# Patient Record
Sex: FEMALE | Race: WHITE
[De-identification: names, ages, dates, MRNs, and addresses within clinical notes are randomized per-mention and may not be internally consistent; named-entity substitution may affect disease eponyms.]

---

## 2017-01-26 ENCOUNTER — HOSPITAL ENCOUNTER (OUTPATIENT)
Dept: HOSPITAL 75 - RAD | Age: 47
End: 2017-01-26
Attending: INTERNAL MEDICINE
Payer: COMMERCIAL

## 2017-01-26 DIAGNOSIS — E04.1: Primary | ICD-10-CM

## 2017-01-26 PROCEDURE — 76536 US EXAM OF HEAD AND NECK: CPT

## 2017-01-26 NOTE — XMS REPORT
Continuity of Care Document

 Created on: 2017



ValentineMichelle sylvester

External Reference #: [id number]

: 1970

Sex: Female



Demographics







 Address  [address] [place] [address]  [address]

 

 Home Phone  [phone]

 

 Preferred Language  English

 

 Marital Status  

 

 Latter day Affiliation  UNK

 

 Race  Unknown

 

 Ethnic Group  Not  or 





Author







 Author  [facility]

 

 Organization  [facility]

 

 Address  Unknown

 

 Phone  Unavailable







Support







 Name  Relationship  Address  Phone

 

 , GORGE SIMMONS  ECON  Unknown  [phone]







Care Team Providers







 Care Team Member Name  Role  Phone

 

 Debby Terrell  PCP  [phone]







Source Comments

Some departments are not documenting in the electronic medical record.  If you 
do not see the information that you expected, contact Release of Information in 
the Health Information Management department at [phone] for further 
assistance in locating additional records.[facility]



Active Allergies and Adverse Reactions







    



  Allergen   Noted Date   Severity   Reactions   Comments

 

    



  Codeine   2013    UNKNOWN 

 

    



  Sulfur   2013    RASH 







Current Medications







      



  Prescription   Sig.   Disp.   Refills   Start   End Date   Status



      Date  

 

      



  ALPRAZolam (XANAX) 0.5 mg   Take 0.25 mg by mouth       Active



  tablet   every 8-12 hours as     



   needed.     

 

      



  methimazole (TAPAZOLE) 10   Take 30 mg by mouth three       Active



  mg tablet   times daily.     

 

      



  HYDROcodone-acetaminophen   Take 1 Tab by mouth every       Active



  (+) (LORTAB) 7.5-500 mg   4 hours as needed.     



  tablet      

 

      



  diclofenac(+) (VOLTAREN)   Apply to the upper   1 Tube   3   20    
Active



  1 % Gel topical gel   extremity - (2g) to the     13  



   affected area up to 4     



   times a day.  Do not     



   apply more than 8 g daily     



   to any one affected joint     



   of the upper extremity.     

 

      



  gluc-condr-om3-dha-epa-fi   Take  by mouth.       Active



  sh-st (GLUCOSAMINE      



  CHONDROITIN PLUS)      



  543-305-09-54 mg cap      

 

      



  MULTIVITAMIN PO   Take  by mouth.       Active







Active Problems







 



  Problem   Noted Date

 

 



  Subdeltoid bursitis   2013

 

 



  Carpal tunnel syndrome, bilateral   2013

 

 



  Shoulder pain, right   2013

 

 



  Carpal tunnel syndrome on both sides   2013

 

 



  Recurrent miscarriages   2013

 

 



  Grave's disease   2013

 

 



  Polyarthralgia   2013

 

 



  Fatigue   2013







Social History







    



  Tobacco Use   Types   Packs/Day   Years Used   Date

 

    



  Former Smoker      Quit: 2012







Last Filed Vital Signs







  



  Vital Sign   Reading   Time Taken

 

  



  Blood Pressure   119/70   2013 11:08 AM CDT

 

  



  Pulse   98   2013 11:08 AM CDT

 

  



  Temperature   36.8   C (98.2   F)   2013 11:08 AM CDT

 

  



  Respiratory Rate   17   2013 11:08 AM CDT

 

  



  Height   1.674 m (5' 5.91")   2013 11:08 AM CDT

 

  



  Weight   78.109 kg (172 lb 3.2 oz)   2013 11:08 AM CDT

 

  



  Body Mass Index   27.87   2013 11:08 AM CDT

 

  



  Oxygen Saturation   -   -







Plan of Care







   



  Health Maintenance   Due Date   Last Done   Comments

 

   



  Physical (Comprehensive)   1979  



  Exam   

 

   



  Pertussis Vaccine   1983  

 

   



  Tetanus Vaccine   1989  

 

   



  Cervical Cancer Screening   1993  

 

   



  Influenza Vaccine   2016  







Results from Last 3 Months

Not on file

## 2017-01-26 NOTE — DIAGNOSTIC IMAGING REPORT
PROCEDURE: US Thyroid.



TECHNIQUE: Multiple real-time grayscale images were obtained of

the thyroid in various projections.



INDICATION: Follow-up exam.



FINDINGS: The previous thyroid ultrasound exam of 09/19/2016

noted a small 0.8 x 0.7 x 0.5 cm complex solid cystic mass in

the inferior pole of the right lobe of the thyroid. This did

seem slightly greater than noted on the prior exam of

12/30/2013. On this exam that finding is again evident and

unchanged in size or appearance. The overall appearance of the

thyroid gland itself is stable. No new abnormality has

developed.



IMPRESSION:

1. The small complex solid cystic mass in the inferior pole of

the right lobe of the thyroid seen previously is stable. Most

likely, this is a benign process. If further evaluation is

desired, then a follow-up ultrasound exam in six months should

be obtained.

2. The thyroid gland is otherwise stable.



Dictated by:



Dictated on workstation # [id number]

## 2017-01-27 ENCOUNTER — HOSPITAL ENCOUNTER (OUTPATIENT)
Dept: HOSPITAL 75 - CARD | Age: 47
End: 2017-01-27
Attending: PAIN MEDICINE
Payer: COMMERCIAL

## 2017-01-27 VITALS — SYSTOLIC BLOOD PRESSURE: 114 MMHG | DIASTOLIC BLOOD PRESSURE: 78 MMHG

## 2017-01-27 VITALS — BODY MASS INDEX: 25.07 KG/M2 | WEIGHT: 156 LBS | HEIGHT: 66 IN

## 2017-01-27 VITALS — DIASTOLIC BLOOD PRESSURE: 82 MMHG | SYSTOLIC BLOOD PRESSURE: 129 MMHG

## 2017-01-27 DIAGNOSIS — M53.3: ICD-10-CM

## 2017-01-27 DIAGNOSIS — M51.16: Primary | ICD-10-CM

## 2017-01-27 DIAGNOSIS — Z79.899: ICD-10-CM

## 2017-01-27 PROCEDURE — 27096 INJECT SACROILIAC JOINT: CPT

## 2017-01-27 PROCEDURE — 62323 NJX INTERLAMINAR LMBR/SAC: CPT

## 2017-01-27 NOTE — XMS REPORT
Continuity of Care Document

 Created on: 2017



ValentineMichelle sylvester

External Reference #: [id number]

: 1970

Sex: Female



Demographics







 Address  [address] [place] [address]  [address]

 

 Home Phone  [phone]

 

 Preferred Language  English

 

 Marital Status  

 

 Mu-ism Affiliation  UNK

 

 Race  Unknown

 

 Ethnic Group  Not  or 





Author







 Author  [facility]

 

 Organization  [facility]

 

 Address  Unknown

 

 Phone  Unavailable







Support







 Name  Relationship  Address  Phone

 

 , GORGE SIMMONS  ECON  Unknown  [phone]







Care Team Providers







 Care Team Member Name  Role  Phone

 

 Debby Terrell  PCP  [phone]







Source Comments

Some departments are not documenting in the electronic medical record.  If you 
do not see the information that you expected, contact Release of Information in 
the Health Information Management department at [phone] for further 
assistance in locating additional records.[facility]



Active Allergies and Adverse Reactions







    



  Allergen   Noted Date   Severity   Reactions   Comments

 

    



  Codeine   2013    UNKNOWN 

 

    



  Sulfur   2013    RASH 







Current Medications







      



  Prescription   Sig.   Disp.   Refills   Start   End Date   Status



      Date  

 

      



  ALPRAZolam (XANAX) 0.5 mg   Take 0.25 mg by mouth       Active



  tablet   every 8-12 hours as     



   needed.     

 

      



  methimazole (TAPAZOLE) 10   Take 30 mg by mouth three       Active



  mg tablet   times daily.     

 

      



  HYDROcodone-acetaminophen   Take 1 Tab by mouth every       Active



  (+) (LORTAB) 7.5-500 mg   4 hours as needed.     



  tablet      

 

      



  diclofenac(+) (VOLTAREN)   Apply to the upper   1 Tube   3   20    
Active



  1 % Gel topical gel   extremity - (2g) to the     13  



   affected area up to 4     



   times a day.  Do not     



   apply more than 8 g daily     



   to any one affected joint     



   of the upper extremity.     

 

      



  gluc-condr-om3-dha-epa-fi   Take  by mouth.       Active



  sh-st (GLUCOSAMINE      



  CHONDROITIN PLUS)      



  415-217-02-54 mg cap      

 

      



  MULTIVITAMIN PO   Take  by mouth.       Active







Active Problems







 



  Problem   Noted Date

 

 



  Subdeltoid bursitis   2013

 

 



  Carpal tunnel syndrome, bilateral   2013

 

 



  Shoulder pain, right   2013

 

 



  Carpal tunnel syndrome on both sides   2013

 

 



  Recurrent miscarriages   2013

 

 



  Grave's disease   2013

 

 



  Polyarthralgia   2013

 

 



  Fatigue   2013







Social History







    



  Tobacco Use   Types   Packs/Day   Years Used   Date

 

    



  Former Smoker      Quit: 2012







Last Filed Vital Signs







  



  Vital Sign   Reading   Time Taken

 

  



  Blood Pressure   119/70   2013 11:08 AM CDT

 

  



  Pulse   98   2013 11:08 AM CDT

 

  



  Temperature   36.8   C (98.2   F)   2013 11:08 AM CDT

 

  



  Respiratory Rate   17   2013 11:08 AM CDT

 

  



  Height   1.674 m (5' 5.91")   2013 11:08 AM CDT

 

  



  Weight   78.109 kg (172 lb 3.2 oz)   2013 11:08 AM CDT

 

  



  Body Mass Index   27.87   2013 11:08 AM CDT

 

  



  Oxygen Saturation   -   -







Plan of Care







   



  Health Maintenance   Due Date   Last Done   Comments

 

   



  Physical (Comprehensive)   1979  



  Exam   

 

   



  Pertussis Vaccine   1983  

 

   



  Tetanus Vaccine   1989  

 

   



  Cervical Cancer Screening   1993  

 

   



  Influenza Vaccine   2016  







Results from Last 3 Months

Not on file

## 2017-01-27 NOTE — PAIN MEDICINE-PROCEDURE
Procedure


Pre-Op/Post-Op Diagnosis


Diagnosis:  disc disorder with radiculopathy, lumbar


Indications for Operation


Low back and hip pain


Attending Surgeon


Brionna





Procedure


Date of Service:  Jan 27, 2017


Procedure: Lumbar Epidural Steroid Injection at the L5/S1 Level under 

Fluoroscopic Guidance and left sacroiliac joint injection





Procedure:


Patient was identified in the holding area. After risks, benefits, and 

alternatives were discussed with the patient, informed consent was obtained. 

Patient was brought to the fluoroscopy suite and placed prone on the procedure 

room table. A time out was performed.   Vital signs were monitored throughout 

the procedure. The patients low back was prepped and draped in the usual 

sterile fashion. The patients skin was anesthetized using 2% Lidocaine. A 

Tuohy needle was inserted and advanced to the L5-S1 epidural space under 

fluoroscopic guidance using the loss of resistance technique and intermittent 

projection of fluoroscopy. There was no  paresthesia with needle placement.





The needle position was confirmed in both the AP and lateral view.





After negative aspiration 2ml of non-ionic contrast was injected under live 

fluoroscopy which showed good spread of the contrast in the epidural space at 

the appropriate level, there was no intravascular or subarachnoid spread. 





Again, after negative aspiration for heme or CSF, 2 ml of 0.25% Bupivicaine, 

2ml of preservative free normal saline, and 80mg of Kenalog was injected. The 

needle was removed and a sterile bandage was placed.





Attention was then directed to the left sacroiliac joint which was identified 

under fluoroscopic guidance.  The skin overlying the posterior inferior one 

third of the sacroiliac joint was anesthetized with 1 percent lidocaine and a 22

-gauge 3-1/2 inch needle was inserted and advanced into the joint.  Following 

negative aspiration a total of 40 mg of Kenalog and 2 mL of 0.25 percent 

bupivacaine was injected.  Needle was flushed with lidocaine and removed.  

Sterile bandage was applied.  Patient tolerated the procedures well with no 

apparent complications.


Complications


None








PDERO MONIQUE MD Jan 27, 2017 12:51 pm

## 2017-03-13 ENCOUNTER — HOSPITAL ENCOUNTER (OUTPATIENT)
Dept: HOSPITAL 75 - LAB | Age: 47
Discharge: HOME | End: 2017-03-13
Attending: FAMILY MEDICINE
Payer: COMMERCIAL

## 2017-03-13 VITALS — DIASTOLIC BLOOD PRESSURE: 61 MMHG | SYSTOLIC BLOOD PRESSURE: 104 MMHG

## 2017-03-13 VITALS — WEIGHT: 154 LBS | HEIGHT: 66 IN | BODY MASS INDEX: 24.75 KG/M2

## 2017-03-13 DIAGNOSIS — K52.9: ICD-10-CM

## 2017-03-13 DIAGNOSIS — R50.9: ICD-10-CM

## 2017-03-13 DIAGNOSIS — R05: ICD-10-CM

## 2017-03-13 DIAGNOSIS — E86.0: Primary | ICD-10-CM

## 2017-03-13 LAB
ANION GAP SERPL CALC-SCNC: 13 MMOL/L (ref 5–14)
BASOPHILS # BLD AUTO: 0 10^3/UL (ref 0–0.1)
BASOPHILS NFR BLD AUTO: 0 % (ref 0–10)
BUN SERPL-MCNC: 9 MG/DL (ref 7–18)
BUN/CREAT SERPL: 13
CALCIUM SERPL-MCNC: 9.2 MG/DL (ref 8.5–10.1)
CHLORIDE SERPL-SCNC: 106 MMOL/L (ref 98–107)
CO2 SERPL-SCNC: 21 MMOL/L (ref 21–32)
CREAT SERPL-MCNC: 0.71 MG/DL (ref 0.6–1.3)
EOSINOPHIL # BLD AUTO: 0 10^3/UL (ref 0–0.3)
EOSINOPHIL NFR BLD AUTO: 0 % (ref 0–10)
ERYTHROCYTE [DISTWIDTH] IN BLOOD BY AUTOMATED COUNT: 13.9 % (ref 10–14.5)
GFR SERPLBLD BASED ON 1.73 SQ M-ARVRAT: > 60 ML/MIN
GLUCOSE SERPL-MCNC: 92 MG/DL (ref 70–105)
LYMPHOCYTES # BLD AUTO: 1.6 X 10^3 (ref 1–4)
LYMPHOCYTES NFR BLD AUTO: 30 % (ref 12–44)
MCH RBC QN AUTO: 28 PG (ref 25–34)
MCHC RBC AUTO-ENTMCNC: 34 G/DL (ref 32–36)
MCV RBC AUTO: 82 FL (ref 80–99)
MONOCYTES # BLD AUTO: 0.5 X 10^3 (ref 0–1)
MONOCYTES NFR BLD AUTO: 10 % (ref 0–12)
NEUTROPHILS # BLD AUTO: 3.3 X 10^3 (ref 1.8–7.8)
NEUTROPHILS NFR BLD AUTO: 60 % (ref 42–75)
PLATELET # BLD: 263 10^3/UL (ref 130–400)
PMV BLD AUTO: 9 FL (ref 7.4–10.4)
POTASSIUM SERPL-SCNC: 3.2 MMOL/L (ref 3.6–5)
RBC # BLD AUTO: 5.44 10^6/UL (ref 4.35–5.85)
SODIUM SERPL-SCNC: 140 MMOL/L (ref 135–145)
WBC # BLD AUTO: 5.5 10^3/UL (ref 4.3–11)

## 2017-03-13 PROCEDURE — 71020: CPT

## 2017-03-13 PROCEDURE — 36415 COLL VENOUS BLD VENIPUNCTURE: CPT

## 2017-03-13 PROCEDURE — 80048 BASIC METABOLIC PNL TOTAL CA: CPT

## 2017-03-13 PROCEDURE — 96360 HYDRATION IV INFUSION INIT: CPT

## 2017-03-13 PROCEDURE — 85025 COMPLETE CBC W/AUTO DIFF WBC: CPT

## 2017-03-13 PROCEDURE — 96361 HYDRATE IV INFUSION ADD-ON: CPT

## 2017-03-13 NOTE — XMS REPORT
Continuity of Care Document

 Created on: 2017



ValentineMichelle sylvester

External Reference #: [id number]

: 1970

Sex: Female



Demographics







 Address  [address] [place] [address]  [address]

 

 Home Phone  [phone]

 

 Preferred Language  English

 

 Marital Status  

 

 Orthodoxy Affiliation  UNK

 

 Race  Unknown

 

 Ethnic Group  Not  or 





Author







 Author  [facility]

 

 Organization  [facility]

 

 Address  Unknown

 

 Phone  Unavailable







Support







 Name  Relationship  Address  Phone

 

 , GORGE SIMMONS  ECON  Unknown  [phone]







Care Team Providers







 Care Team Member Name  Role  Phone

 

 Debby Terrell  PCP  [phone]







Source Comments

Some departments are not documenting in the electronic medical record.  If you 
do not see the information that you expected, contact Release of Information in 
the Health Information Management department at [phone] for further 
assistance in locating additional records.[facility]



Active Allergies and Adverse Reactions







    



  Allergen   Noted Date   Severity   Reactions   Comments

 

    



  Codeine   2013    UNKNOWN 

 

    



  Sulfur   2013    RASH 







Current Medications







      



  Prescription   Sig.   Disp.   Refills   Start   End Date   Status



      Date  

 

      



  ALPRAZolam (XANAX) 0.5 mg   Take 0.25 mg by mouth       Active



  tablet   every 8-12 hours as     



   needed.     

 

      



  methimazole (TAPAZOLE) 10   Take 30 mg by mouth three       Active



  mg tablet   times daily.     

 

      



  HYDROcodone-acetaminophen   Take 1 Tab by mouth every       Active



  (+) (LORTAB) 7.5-500 mg   4 hours as needed.     



  tablet      

 

      



  diclofenac(+) (VOLTAREN)   Apply to the upper   1 Tube   3   20    
Active



  1 % Gel topical gel   extremity - (2g) to the     13  



   affected area up to 4     



   times a day.  Do not     



   apply more than 8 g daily     



   to any one affected joint     



   of the upper extremity.     

 

      



  gluc-condr-om3-dha-epa-fi   Take  by mouth.       Active



  sh-st (GLUCOSAMINE      



  CHONDROITIN PLUS)      



  113-807-02-54 mg cap      

 

      



  MULTIVITAMIN PO   Take  by mouth.       Active







Active Problems







 



  Problem   Noted Date

 

 



  Subdeltoid bursitis   2013

 

 



  Carpal tunnel syndrome, bilateral   2013

 

 



  Shoulder pain, right   2013

 

 



  Carpal tunnel syndrome on both sides   2013

 

 



  Recurrent miscarriages   2013

 

 



  Grave's disease   2013

 

 



  Polyarthralgia   2013

 

 



  Fatigue   2013







Social History







    



  Tobacco Use   Types   Packs/Day   Years Used   Date

 

    



  Former Smoker      Quit: 2012







Last Filed Vital Signs







  



  Vital Sign   Reading   Time Taken

 

  



  Blood Pressure   119/70   2013 11:08 AM CDT

 

  



  Pulse   98   2013 11:08 AM CDT

 

  



  Temperature   36.8   C (98.2   F)   2013 11:08 AM CDT

 

  



  Respiratory Rate   17   2013 11:08 AM CDT

 

  



  Height   1.674 m (5' 5.91")   2013 11:08 AM CDT

 

  



  Weight   78.109 kg (172 lb 3.2 oz)   2013 11:08 AM CDT

 

  



  Body Mass Index   27.87   2013 11:08 AM CDT

 

  



  Oxygen Saturation   -   -







Plan of Care







   



  Health Maintenance   Due Date   Last Done   Comments

 

   



  Physical (Comprehensive)   1979  



  Exam   

 

   



  Pertussis Vaccine   1983  

 

   



  Tetanus Vaccine   1989  

 

   



  Cervical Cancer Screening   1993  

 

   



  Influenza Vaccine   2016  







Results from Last 3 Months

Not on file

## 2017-03-13 NOTE — DIAGNOSTIC IMAGING REPORT
PA and lateral views of the chest



Indication:  COUGH



Findings: The lungs are clear. The heart size is normal. There

is no effusion or pneumothorax



The mediastinum and valentino appear unremarkable.



Impression: Unremarkable study.



Dictated by:



Dictated on workstation # [id number]

## 2017-03-14 NOTE — PHYSICIAN QUERY-FINAL DX
UZMA SEBASTIAN 3/14/17 1133:


Clinic Account Progress/Dx


Physician Query:





Please give a diagnosis for the normal saline 1 liter treatment





thank you


Date of Service


Mar 13, 2017 at 14:28





SHELLY CHAVEZ MD 3/15/17 0729:


Clinic Account Progress/Dx


DIAGNOSIS:


Diagnosis


1.  Dehydration


2.  Gastroenteritis








UZMA SEBASTIAN Mar 14, 2017 11:33


SHELLY CHAVEZ MD Mar 15, 2017 07:29

## 2017-05-19 ENCOUNTER — HOSPITAL ENCOUNTER (OUTPATIENT)
Dept: HOSPITAL 75 - CARD | Age: 47
Discharge: HOME | End: 2017-05-19
Attending: PAIN MEDICINE
Payer: COMMERCIAL

## 2017-05-19 VITALS — HEIGHT: 66 IN | WEIGHT: 154 LBS | BODY MASS INDEX: 24.75 KG/M2

## 2017-05-19 VITALS — DIASTOLIC BLOOD PRESSURE: 92 MMHG | SYSTOLIC BLOOD PRESSURE: 117 MMHG

## 2017-05-19 VITALS — DIASTOLIC BLOOD PRESSURE: 88 MMHG | SYSTOLIC BLOOD PRESSURE: 120 MMHG

## 2017-05-19 DIAGNOSIS — M51.16: Primary | ICD-10-CM

## 2017-05-19 DIAGNOSIS — M53.3: ICD-10-CM

## 2017-05-19 DIAGNOSIS — Z79.899: ICD-10-CM

## 2017-05-19 PROCEDURE — 62323 NJX INTERLAMINAR LMBR/SAC: CPT

## 2017-05-19 PROCEDURE — 27096 INJECT SACROILIAC JOINT: CPT

## 2017-05-19 NOTE — PAIN MEDICINE-PROCEDURE
Procedure


Pre-Op/Post-Op Diagnosis


Diagnosis:  disc disorder with radiculopathy, lumbar


Indications for Operation


Low back and hip pain


Attending Surgeon


Brionna





Procedure


Date of Service:  May 19, 2017


Procedure: Lumbar Epidural Steroid Injection at the L5/S1 Level under 

Fluoroscopic Guidance and left sacroiliac joint injection





Procedure:


Patient was identified in the holding area. After risks, benefits, and 

alternatives were discussed with the patient, informed consent was obtained. 

Patient was brought to the fluoroscopy suite and placed prone on the procedure 

room table. A time out was performed.   Vital signs were monitored throughout 

the procedure. The patients low back was prepped and draped in the usual 

sterile fashion. The patients skin was anesthetized using 2% Lidocaine. A 

Tuohy needle was inserted and advanced to the L5-S1 epidural space under 

fluoroscopic guidance using the loss of resistance technique and intermittent 

projection of fluoroscopy. There was no  paresthesia with needle placement.





The needle position was confirmed in both the AP and lateral view.





After negative aspiration 2ml of non-ionic contrast was injected under live 

fluoroscopy which showed good spread of the contrast in the epidural space at 

the appropriate level, there was no intravascular or subarachnoid spread. 





Again, after negative aspiration for heme or CSF, 2 ml of 0.25% Bupivicaine, 

2ml of preservative free normal saline, and 80mg of Kenalog was injected. The 

needle was removed and a sterile bandage was placed.





Attention was then directed to the left sacroiliac joint which was identified 

under fluoroscopic guidance.  The skin overlying the posterior inferior one 

third of the sacroiliac joint was anesthetized with 1 percent lidocaine and a 22

-gauge 3-1/2 inch needle was inserted and advanced into the joint.  Following 

negative aspiration a total of 40 mg of Kenalog and 2 mL of 0.25 percent 

bupivacaine was injected.  Needle was flushed with lidocaine and removed.  

Sterile bandage was applied.  Patient tolerated the procedures well with no 

apparent complications.


Complications


None











PEDRO MONIQUE MD May 19, 2017 12:40 pm

## 2017-09-08 ENCOUNTER — HOSPITAL ENCOUNTER (EMERGENCY)
Dept: HOSPITAL 75 - ER | Age: 47
Discharge: HOME | End: 2017-09-08
Payer: COMMERCIAL

## 2017-09-08 VITALS — WEIGHT: 150 LBS | HEIGHT: 66 IN | BODY MASS INDEX: 24.11 KG/M2

## 2017-09-08 VITALS — SYSTOLIC BLOOD PRESSURE: 125 MMHG | DIASTOLIC BLOOD PRESSURE: 77 MMHG

## 2017-09-08 DIAGNOSIS — E03.9: ICD-10-CM

## 2017-09-08 DIAGNOSIS — Z90.710: ICD-10-CM

## 2017-09-08 DIAGNOSIS — F17.210: ICD-10-CM

## 2017-09-08 DIAGNOSIS — G47.9: ICD-10-CM
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
## 2017-09-08 DIAGNOSIS — J45.909: ICD-10-CM

## 2017-09-08 DIAGNOSIS — Z87.39: ICD-10-CM

## 2017-09-08 DIAGNOSIS — Z90.89: ICD-10-CM

## 2017-09-08 DIAGNOSIS — S72.421A: Primary | ICD-10-CM

## 2017-09-08 DIAGNOSIS — F32.9: ICD-10-CM

## 2017-09-08 DIAGNOSIS — F41.9: ICD-10-CM

## 2017-09-08 PROCEDURE — 99283 EMERGENCY DEPT VISIT LOW MDM: CPT

## 2017-09-08 PROCEDURE — 73562 X-RAY EXAM OF KNEE 3: CPT

## 2017-09-08 NOTE — XMS REPORT
Clinical Summary

 Created on: 2017



Michelle Valentine

External Reference #: [id number]

: 1970

Sex: Female



Demographics







 Address  [address] [place] [address]  [address]

 

 Home Phone  [phone]

 

 Preferred Language  English

 

 Marital Status  Unknown

 

 Sabianism Affiliation  UNK

 

 Race  Unknown

 

 Ethnic Group  Not  or 





Author







 Author  [facility]

 

 Organization  [facility]

 

 Address  Unknown

 

 Phone  Unavailable







Support







 Name  Relationship  Address  Phone

 

 , GORGE SIMMONS  ECON  Unknown  [phone]







Care Team Providers







 Care Team Member Name  Role  Phone

 

  PCP  Unavailable







Source Comments

Some departments are not documenting in the electronic medical record.  If you 
do not see the information that you expected, contact Release of Information in 
the Health Information Management department at [phone] for further 
assistance in locating additional records.[facility]



Allergies







    



  Active Allergy   Reactions   Severity   Noted Date   Comments

 

    



  Codeine   UNKNOWN    2013 

 

    



  Sulfur   RASH    2013 







Current Medications







      



  Prescription   Sig.   Disp.   Refills   Start   End Date   Status



      Date  

 

      



  ALPRAZolam (XANAX) 0.5 mg   Take 0.25 mg by mouth       Active



  tablet   every 8-12 hours as     



   needed.     

 

      



  methimazole (TAPAZOLE) 10   Take 30 mg by mouth three       Active



  mg tablet   times daily.     

 

      



  HYDROcodone-acetaminophen   Take 1 Tab by mouth every       Active



  (+) (LORTAB) 7.5-500 mg   4 hours as needed.     



  tablet      

 

      



  diclofenac(+) (VOLTAREN)   Apply to the upper   1 Tube   3   20    
Active



  1 % Gel topical   extremity - (2g) to the     13  



  gelIndications:   affected area up to 4     



  Polyarthralgia   times a day.  Do not     



   apply more than 8 g daily     



   to any one affected joint     



   of the upper extremity.     

 

      



  gluc-condr-om3-dha-epa-fi   Take  by mouth.       Active



  sh-st (GLUCOSAMINE      



  CHONDROITIN PLUS)      



  263-617-44-54 mg cap      

 

      



  MULTIVITAMIN PO   Take  by mouth.       Active







Active Problems







 



  Problem   Noted Date

 

 



  Subdeltoid bursitis   2013

 

 



  Carpal tunnel syndrome, bilateral   2013

 

 



  Shoulder pain, right   2013

 

 



  Carpal tunnel syndrome on both sides   2013

 

 



  Recurrent miscarriages   2013

 

 



  Grave's disease   2013

 

 



  Polyarthralgia   2013

 

 



  Fatigue   2013







Social History







    



  Tobacco Use   Types   Packs/Day   Years Used   Date

 

    



  Former Smoker      Quit: 2012









 



  Sex Assigned at Birth   Date Recorded

 

 



  Not on file 







Last Filed Vital Signs







  



  Vital Sign   Reading   Time Taken

 

  



  Blood Pressure   119/70   2013 11:08 AM CDT

 

  



  Pulse   98   2013 11:08 AM CDT

 

  



  Temperature   36.8   C (98.2   F)   2013 11:08 AM CDT

 

  



  Respiratory Rate   17   2013 11:08 AM CDT

 

  



  Oxygen Saturation   -   -

 

  



  Inhaled Oxygen   -   -



  Concentration  

 

  



  Weight   78.1 kg (172 lb 3.2 oz)   2013 11:08 AM CDT

 

  



  Height   167.4 cm (5' 5.91")   2013 11:08 AM CDT

 

  



  Body Mass Index   27.87   2013 11:08 AM CDT







Plan of Treatment







   



  Health Maintenance   Due Date   Last Done   Comments

 

   



  PHYSICAL (COMPREHENSIVE)   1979  



  EXAM   

 

   



  PERTUSSIS VACCINE   1983  

 

   



  TETANUS VACCINE   1989  

 

   



  CERVICAL CANCER SCREENING   2002  

 

   



  BREAST CANCER SCREENING   2012  

 

   



  INFLUENZA VACCINE   2017  







Results

Not on filefrom Last 3 Months

## 2017-09-08 NOTE — ED LOWER EXTREMITY
General


Chief Complaint:  Lower Extremity


Stated Complaint:  RT KNEE PAIN


Nursing Triage Note:  


Reports having twisted rt knee 4 days ago, believed to be related to Left hip 


surgery approximately 4.5 years ago.


Nursing Sepsis Screen:  No Definite Risk


Source:  patient


Exam Limitations:  no limitations





History of Present Illness


Time seen by provider:  11:00


Initial Comments


The patient is a 47-year-old white female who presents with complaints of right 

knee pain and difficulty walking.  She relates that 4+ years ago while an 

employee at the [employer] she suffered a fall which caused her to 

have hip problems on the left.  She reports that she subsequently had surgery 

on this at [address].  She alleges that during surgery they cut her 

sciatic and caused 2 disks to rupture by accident.  She continues to have pain 

and problems with the left hip.  Earlier this week the left hip seemed to lock 

up and she struggled to stay upright.  This seemed to cause pain in the right 

knee which has increased since that time.  She has had narcotic pain relievers 

at home which she took for the hip they have not helped control knee pain.


Onset:  last week


Pain/Injury Location:  right knee


Method of Injury:  twisted





Allergies and Home Medications


Allergies


Coded Allergies:  


     Sulfa (Sulfonamide Antibiotics) (Unverified  Allergy, Mild, HIVE ON THROAT 

AND UPPER CHEST, 1/22/10)


     codeine (Unverified  Allergy, Mild, 1/4/08)


     hydrocodone (Unverified  Allergy, Unknown, 9/18/14)





Home Medications


Baclofen 10 Mg Tablet, 10 MG PO PRN, (Reported)


Baclofen 1,000 Gm Powder, 1,000 GM MC for MUSCLE SPASMS, (Reported)


Cyclobenzaprine HCl 10 Mg Tablet, (Reported)


Estradiol 1 Mg Tablet, 1 MG PO DAILY, (Reported)


Hydrocodone/Acetaminophen 1 Each Tablet, 1 EACH PO, (Reported)


Hydroxyzine HCl 50 Mg Tablet, (Reported)


Ibuprofen 800 Mg Tablet, 800 MG PO q8h PRN for PAIN, #60 Ref 2


   Prescribed by: SHELLY CHAVEZ on 9/22/14 0824


Venlafaxine HCl 150 Mg Cap.er.24h, 150 MG PO, (Reported)





Constitutional:  see HPI


EENTM:  no symptoms reported


Respiratory:  no symptoms reported


Cardiovascular:  no symptoms reported


Gastrointestinal:  no symptoms reported


Genitourinary:  no symptoms reported


Musculoskeletal:  see HPI


Skin:  no symptoms reported


Psychiatric/Neurological:  No Symptoms Reported





Past Medical-Social-Family Hx


Patient Social History


Alcohol Use:  Occasionally Uses


Alcohol Beverage of Choice:  Beer


Recreational Drug Use:  No


Smoking Status:  Light Tobacco Smoker


Type Used:  Cigarettes


Recent Foreign Travel:  No


Contact w/Someone Who Travel:  No


Recent Infectious Disease Expo:  No


Recent Hopitalizations:  No





Immunizations Up To Date


Tetanus Booster (TDap):  Unknown


PED Vaccines UTD:  No


Date of Pneumonia Vaccine:  Jan 1, 2014


Date of Influenza Vaccine:  Oct 7, 2015





Seasonal Allergies


Seasonal Allergies:  No





Surgeries


History of Surgeries:  Yes (D&C, LT HIP REPAIR W/ NERVE DAMAGE)


Surgeries:  Adenoidectomy, Gallbladder, Hysterectomy, Orthopedic, Tonsillectomy





Respiratory


History of Respiratory Disorde:  Yes


Respiratory Disorders:  Asthma





Cardiovascular


History of Cardiac Disorders:  No





Neurological


History of Neurological Disord:  Yes (BELL'S PALSY.  "Nerve damage of the left 

hip and LLE residual effects")





Reproductive System


Pregnant:  No


Hx Reproductive Disorders:  Yes (COMPLETE HYSTERECTOMY)


Sexually Transmitted Disease:  No


HIV/AIDS:  No


Female Reproductive Disorders:  Endometriosis


GYN History:  Hysterectomy





Gastrointestinal


History of Gastrointestinal Di:  No





Musculoskeletal


History of Musculoskeletal Dis:  Yes (BULGING DISCS.  CHRONIC NECK PAIN.)


Musculoskeletal Disorders:  Chronic Back Pain





Endocrine


History of Endocrine Disorders:  Yes (GRAVES DIEASE )


Endocrine Disorders:  Hyperthyroidism





HEENT


Loss of Vision:  Denies


Hearing Impairment:  Denies





Cancer


History of Cancer:  No





Psychosocial


History of Psychiatric Problem:  Yes


Behavioral Health Disorders:  Sleep Difficulties, Anxiety, Depression





Integumentary


History of Skin or Integumenta:  Yes ("ALLERGIC TO SUN")





Blood Transfusions


History of Blood Disorders:  No





Family Medical History


Family Medial History:  


Arthritis


  19 MOTHER, Onset:Unknown


Asthma


  19 FATHER, Onset:Unknown


Breast cancer in mother


Hypertension


  19 MOTHER, Onset:Unknown


Respiratory disorder


  19 FATHER, Onset:Unknown


Thyroid disease


  19 MOTHER, Onset:Unknown





No Family History of:


  AIDS


  Abdominal aortic aneurysm


  Dawood's disease


  Alcoholism


  Alzheimer's disease


  Aphasia


  Cancer of mouth


  Cardiovascular disease


  Cataracts


  Colon cancer


  Completed stroke


  Congenital disease


  Congenital heart disease


  Coronary thrombosis


  Cystic fibrosis


  Deafness or hearing loss


  Dementia


  Diabetes mellitus


  Drug abuse


  Dysphasia


  Fibrocystic disease of breast


  Gastroenteritis


  Glaucoma


  Headache disorder


  Hypercholesterolemia


  Infertility


  Kidney disease


  Myocardial infarction


  Neoplasm


  Not obtainable due to adoption


  Osteoporosis


  Parkinson's disease


  Prostate cancer


  Psychosocial problem


  Seizure disorder


  Severe allergy


  Tuberculosis


  Visual disorder





Physical Exam


Vital Signs





Vital Sign - Last 12Hours








 9/8/17





 10:41


 


Temp 97.6


 


Resp 16


 


B/P (MAP) 125/77





Capillary Refill : Less Than 3 Seconds


General Appearance:  mild distress


HEENT:  normal ENT inspection


Neck:  full range of motion


Cardiovascular:  normal peripheral pulses, regular rate, rhythm, no edema, no 

gallop, no JVD, no murmur


Respiratory:  chest non-tender, lungs clear, normal breath sounds, no 

respiratory distress, no accessory muscle use


Comments


Right knee shows no visible evidence of swelling.  There is no warmth to touch.

  There is pain to palpation along the fibular head and ligamentous insertion 

area.  No hematoma is noted.





Progress/Results/Core Measures


Results/Orders


My Orders





Orders - LOYDA TERRAZAS MD


Knee, Right, 3 Views (9/8/17 10:59)





Vital Signs/I&O





Vital Sign - Last 12Hours








 9/8/17





 10:41


 


Temp 97.6


 


Resp 16


 


B/P (MAP) 125/77














Blood Pressure Mean:  93











Departure


Communication (Admissions)


Progress Notes


X-rays of the knee show what appears to be an avulsion at the lateral femoral 

condyle.  This is certainly within the area of the tenderness exhibited on 

palpation.





Impression


Impression:  


 Primary Impression:  


 avulsion fracture right lateral femoral condyle


Disposition:  01 HOME, SELF-CARE


Condition:  Stable/Unchanged





Departure-Patient Inst.


Decision time for Depature:  12:17


Referrals:  


SHELLY CHAVEZ MD (PCP/Family)


Primary Care Physician


Patient Instructions:  Knee Sprain (DC), Ligament Injuries in the Knee (DC)





Add. Discharge Instructions:  


All discharge instructions reviewed with patient and/or family. Voiced 

understanding.


Use knee immobilizer.  Reduce activity.  Use ibuprofen or naproxen to reduce 

inflammation and swelling.  Make appointment to see Dr. Dr. Rios at the office











LOYDA TERRAZAS MD Sep 8, 2017 11:08

## 2017-09-08 NOTE — DIAGNOSTIC IMAGING REPORT
INDICATION: Knee injury. Pain.



COMPARISON: None.



FINDINGS: 3 views of the right knee are obtained. There is some

faint osseous density adjacent to the lateral femoral condyle

which does not appear corticated and may represent avulsion

fracture from ligamentous injury. Otherwise no acute fracture,

malalignment, or osseous destructive process is seen. Joint

spaces are preserved. Soft tissues appear unremarkable.



IMPRESSION: There is osseous density adjacent to the lateral

femoral condyle which may represent an avulsion fracture from

ligamentous injury, either acute or chronic. Correlate

clinically. MRI may be of additional benefit if clinically

warranted. No additional abnormality is seen.



Dictated by: 



  Dictated on workstation # [id number]

## 2017-10-18 ENCOUNTER — HOSPITAL ENCOUNTER (OUTPATIENT)
Dept: HOSPITAL 75 - RAD | Age: 47
End: 2017-10-18
Attending: FAMILY MEDICINE
Payer: COMMERCIAL

## 2017-10-18 DIAGNOSIS — R05: Primary | ICD-10-CM

## 2017-10-18 DIAGNOSIS — Z12.31: Primary | ICD-10-CM

## 2017-10-18 PROCEDURE — 77067 SCR MAMMO BI INCL CAD: CPT

## 2017-10-18 PROCEDURE — 71020: CPT

## 2017-10-18 NOTE — DIAGNOSTIC IMAGING REPORT
INDICATION: COUGH



COMPARISON: 3/13/2017



FINDINGS: Frontal and lateral views of the chest demonstrate

normal heart size and pulmonary vascularity. The lungs are clear.

There are no signs of infiltrate, pleural effusions or

pneumothoraces. The visualized osseous structures show no acute

abnormalities.



IMPRESSION: 

1. No acute process. No signs of infiltrates, effusions or

pneumothoraces.



Dictated by: 



  Dictated on workstation # [id number]

## 2017-10-20 NOTE — DIAGNOSTIC IMAGING REPORT
Bilateral screening mammogram 2D views with tomosynthesis



The current study was also evaluated with a Computer Aided

Detection (CAD) system.



Indication: Screening. No current complaints stated on the

questionnaire.



COMPARISON: 10/30/14.



FINDINGS:

The breasts are composed of scattered fibroglandular densities.

There is no mass, architectural distortion or suspicious cluster

of calcifications. Allowing for technique and positional

differences, no suspicious change is seen.



IMPRESSION: No significant change.



ACR BI-RADS Category 2: Benign findings.

Result letter will be mailed to the patient.

Note: At least 10% of breast cancer is not imaged by mammography.



 



Dictated by: 



  Dictated on workstation # [id number]

## 2018-05-09 ENCOUNTER — HOSPITAL ENCOUNTER (OUTPATIENT)
Dept: HOSPITAL 75 - RAD | Age: 48
End: 2018-05-09
Attending: FAMILY MEDICINE
Payer: COMMERCIAL

## 2018-05-09 DIAGNOSIS — M25.531: Primary | ICD-10-CM

## 2018-05-09 PROCEDURE — 73110 X-RAY EXAM OF WRIST: CPT

## 2018-05-09 NOTE — DIAGNOSTIC IMAGING REPORT
INDICATION: Right wrist pain



COMPARISON: None



FINDINGS: 3 views of the right wrist are obtained. No acute

fracture, malalignment or osseous destructive process is seen.



IMPRESSION: Negative right wrist



Dictated by: 



  Dictated on workstation # [id number]

## 2018-06-15 ENCOUNTER — HOSPITAL ENCOUNTER (OUTPATIENT)
Dept: HOSPITAL 75 - LAB | Age: 48
End: 2018-06-15
Attending: FAMILY MEDICINE
Payer: COMMERCIAL

## 2018-06-15 DIAGNOSIS — R05: Primary | ICD-10-CM

## 2018-06-15 PROCEDURE — 71046 X-RAY EXAM CHEST 2 VIEWS: CPT

## 2018-06-15 NOTE — DIAGNOSTIC IMAGING REPORT
INDICATION: Cough.



PA and lateral views of the chest were obtained. Comparison is

made with prior examination from 10/18/2017.



FINDINGS: The heart size, mediastinal configuration, and

pulmonary vascularity are within normal limits. There is no

pleural effusion, pneumothorax, or pneumonia. The osseous

structures are unremarkable. 



IMPRESSION: No acute cardiopulmonary abnormality.



Dictated by: 



  Dictated on workstation # [id number]

## 2018-12-04 ENCOUNTER — HOSPITAL ENCOUNTER (OUTPATIENT)
Dept: HOSPITAL 75 - RAD | Age: 48
End: 2018-12-04
Attending: FAMILY MEDICINE
Payer: COMMERCIAL

## 2018-12-04 DIAGNOSIS — Z12.31: Primary | ICD-10-CM

## 2018-12-04 PROCEDURE — 77067 SCR MAMMO BI INCL CAD: CPT

## 2018-12-04 NOTE — DIAGNOSTIC IMAGING REPORT
INDICATION: 

Routine screening.



COMPARISON:       

10/18/2017 and 10/30/2014.



TECHNIQUE: 

2D and 3D bilateral screening mammography was performed with CAD.



FINDINGS:

Both breasts are heterogeneously dense, limiting the sensitivity

of mammography. No discrete mass or malignant appearing

microcalcifications are seen. Reportedly, the patient has a right

breast lump. Only a screening mammogram was wanted. No underlying

abnormality is seen. The axillae are unremarkable.



IMPRESSION:   

No mammographic features suspicious for malignancy are

identified. Reportedly, the patient has a right breast lump.

Continued close clinical and self breast exams are recommended as

a diagnostic study was not ordered. If this persists, diagnostic

mammography and ultrasound could be performed.



Dictated by: 



  Dictated on workstation # [id number]

## 2019-03-07 ENCOUNTER — HOSPITAL ENCOUNTER (OUTPATIENT)
Dept: HOSPITAL 75 - SDC | Age: 49
Discharge: HOME | End: 2019-03-07
Attending: FAMILY MEDICINE
Payer: COMMERCIAL

## 2019-03-07 VITALS — SYSTOLIC BLOOD PRESSURE: 106 MMHG | DIASTOLIC BLOOD PRESSURE: 76 MMHG

## 2019-03-07 VITALS — DIASTOLIC BLOOD PRESSURE: 64 MMHG | SYSTOLIC BLOOD PRESSURE: 100 MMHG

## 2019-03-07 VITALS — WEIGHT: 150 LBS | HEIGHT: 66 IN | BODY MASS INDEX: 24.11 KG/M2

## 2019-03-07 DIAGNOSIS — E86.0: Primary | ICD-10-CM

## 2019-03-07 NOTE — NUR
IVF COMPLETE.  PT REPORTS IRRITATION ON BREASTS HAS IMPROVED.  RASH REMAINS ON HIPS ET 
GROIN.  WILL CONT TO MONITOR.

## 2019-03-07 NOTE — NUR
PT C/O HIVES OVER HIPS ET GROIN.  NO KNOWN EXPOSURE TO CAUSE REACTION.  NO DIFFICULTY 
BREATHING OR OTHER COMPLAINTS.  DR CHAVEZ NOTIFIED VIA TELEPHONE.  ORDERS REC'D.

## 2019-03-07 NOTE — NUR
BENADRYL 25MG PO GIVEN AS ORDERED.  PT REPORTS ITCHING ET REDNESS HAS SPREAD TO HER BREASTS. 
 NO FURTHER COMPLICATIONS.

## 2019-03-07 NOTE — NUR
PT REPORTS RASH HAS RESOLVED.  NO OTHER SX/SX DISTRESS. Patient understands condition, prognosis and need for follow up care.

## 2019-07-16 ENCOUNTER — HOSPITAL ENCOUNTER (OUTPATIENT)
Dept: HOSPITAL 75 - RAD | Age: 49
End: 2019-07-16
Attending: FAMILY MEDICINE
Payer: COMMERCIAL

## 2019-07-16 DIAGNOSIS — M25.551: Primary | ICD-10-CM

## 2019-07-16 PROCEDURE — 73502 X-RAY EXAM HIP UNI 2-3 VIEWS: CPT

## 2019-07-16 NOTE — DIAGNOSTIC IMAGING REPORT
INDICATION: Painful right hip.



COMPARISON: August 26, 2016.



TECHNIQUE: Two radiographs of the right hip dated July 16, 2019.



FINDINGS: No acute fracture or dislocation. No destructive

osseous process. The right femoral head maintains its normal

shape and contour. Minimal joint space narrowing. No significant

osteophyte formation. Visualized portions of the right sacroiliac

joint are intact. No suspicious radiopaque foreign body.



IMPRESSION: No acute osseous abnormality with very minimal

degenerative changes of the right hip.



Dictated by: 



  Dictated on workstation # [id number]

## 2020-03-04 ENCOUNTER — HOSPITAL ENCOUNTER (OUTPATIENT)
Dept: HOSPITAL 75 - RAD | Age: 50
End: 2020-03-04
Attending: FAMILY MEDICINE
Payer: COMMERCIAL

## 2020-03-04 DIAGNOSIS — Z12.31: Primary | ICD-10-CM

## 2020-03-04 PROCEDURE — 77067 SCR MAMMO BI INCL CAD: CPT

## 2020-03-04 NOTE — DIAGNOSTIC IMAGING REPORT
EXAMINATION: Digital mammogram bilateral screening.



INDICATION: Screening.



COMPARISON: This study was compared to the prior exams of

12/04/2018, 10/18/2017, and 10/24/2014.



At this time, there are no current complaints.



The current study was also evaluated with a Computer Aided

Detection (CAD) system. 3-D tomosynthesis was also performed and

reviewed.



FINDINGS: The fibroglandular tissue in both breasts is

heterogeneously dense. This does limit the sensitivity of this

exam. Overall, there does not appear to have been any significant

change when compared to the prior study. No primary or secondary

sign of malignancy is noted. 3D tomographic images fail to show

any sign of malignancy. 



IMPRESSION: There is no radiographic evidence for malignancy.



ACR BI-RADS Category 1: Negative.

Result letter will be mailed to the patient.

Note:  At least 10% of breast cancer is not imaged by

mammography.



Dictated by: 



  Dictated on workstation # [id number]

## 2021-01-28 ENCOUNTER — HOSPITAL ENCOUNTER (OUTPATIENT)
Dept: HOSPITAL 75 - CARD | Age: 51
End: 2021-01-28
Attending: FAMILY MEDICINE
Payer: COMMERCIAL

## 2021-01-28 DIAGNOSIS — R00.2: Primary | ICD-10-CM

## 2021-01-28 PROCEDURE — 93005 ELECTROCARDIOGRAM TRACING: CPT

## 2021-07-07 ENCOUNTER — HOSPITAL ENCOUNTER (EMERGENCY)
Dept: HOSPITAL 75 - ER | Age: 51
Discharge: HOME | End: 2021-07-07
Payer: COMMERCIAL

## 2021-07-07 VITALS — BODY MASS INDEX: 27.32 KG/M2 | WEIGHT: 169.98 LBS | HEIGHT: 66.02 IN

## 2021-07-07 VITALS — DIASTOLIC BLOOD PRESSURE: 72 MMHG | SYSTOLIC BLOOD PRESSURE: 102 MMHG

## 2021-07-07 DIAGNOSIS — J45.909: ICD-10-CM

## 2021-07-07 DIAGNOSIS — Z20.822: ICD-10-CM

## 2021-07-07 DIAGNOSIS — A04.72: Primary | ICD-10-CM

## 2021-07-07 LAB
ALBUMIN SERPL-MCNC: 4.4 GM/DL (ref 3.2–4.5)
ALP SERPL-CCNC: 81 U/L (ref 40–136)
ALT SERPL-CCNC: 25 U/L (ref 0–55)
APTT PPP: YELLOW S
BACTERIA #/AREA URNS HPF: (no result) /HPF
BASOPHILS # BLD AUTO: 0 10^3/UL (ref 0–0.1)
BASOPHILS NFR BLD AUTO: 1 % (ref 0–10)
BILIRUB SERPL-MCNC: 0.6 MG/DL (ref 0.1–1)
BILIRUB UR QL STRIP: NEGATIVE
BUN/CREAT SERPL: 12
CALCIUM SERPL-MCNC: 9.9 MG/DL (ref 8.5–10.1)
CHLORIDE SERPL-SCNC: 107 MMOL/L (ref 98–107)
CO2 SERPL-SCNC: 25 MMOL/L (ref 21–32)
CREAT SERPL-MCNC: 0.75 MG/DL (ref 0.6–1.3)
EOSINOPHIL # BLD AUTO: 0.1 10^3/UL (ref 0–0.3)
EOSINOPHIL NFR BLD AUTO: 2 % (ref 0–10)
FIBRINOGEN PPP-MCNC: CLEAR MG/DL
GFR SERPLBLD BASED ON 1.73 SQ M-ARVRAT: > 60 ML/MIN
GLUCOSE SERPL-MCNC: 109 MG/DL (ref 70–105)
GLUCOSE UR STRIP-MCNC: NEGATIVE MG/DL
HCT VFR BLD CALC: 43 % (ref 35–52)
HGB BLD-MCNC: 14.3 G/DL (ref 11.5–16)
KETONES UR QL STRIP: NEGATIVE
LEUKOCYTE ESTERASE UR QL STRIP: (no result)
LYMPHOCYTES # BLD AUTO: 2.2 10^3/UL (ref 1–4)
LYMPHOCYTES NFR BLD AUTO: 26 % (ref 12–44)
MANUAL DIFFERENTIAL PERFORMED BLD QL: NO
MCH RBC QN AUTO: 28 PG (ref 25–34)
MCHC RBC AUTO-ENTMCNC: 33 G/DL (ref 32–36)
MCV RBC AUTO: 84 FL (ref 80–99)
MONOCYTES # BLD AUTO: 0.5 10^3/UL (ref 0–1)
MONOCYTES NFR BLD AUTO: 7 % (ref 0–12)
NEUTROPHILS # BLD AUTO: 5.4 10^3/UL (ref 1.8–7.8)
NEUTROPHILS NFR BLD AUTO: 65 % (ref 42–75)
NITRITE UR QL STRIP: NEGATIVE
PH UR STRIP: 8 [PH] (ref 5–9)
PLATELET # BLD: 372 10^3/UL (ref 130–400)
PMV BLD AUTO: 8.8 FL (ref 9–12.2)
POTASSIUM SERPL-SCNC: 3.6 MMOL/L (ref 3.6–5)
PROT SERPL-MCNC: 8.7 GM/DL (ref 6.4–8.2)
PROT UR QL STRIP: NEGATIVE
RBC #/AREA URNS HPF: (no result) /HPF
SODIUM SERPL-SCNC: 141 MMOL/L (ref 135–145)
SP GR UR STRIP: 1.01 (ref 1.02–1.02)
SQUAMOUS #/AREA URNS HPF: (no result) /HPF
WBC # BLD AUTO: 8.3 10^3/UL (ref 4.3–11)
WBC #/AREA URNS HPF: (no result) /HPF

## 2021-07-07 PROCEDURE — 71045 X-RAY EXAM CHEST 1 VIEW: CPT

## 2021-07-07 PROCEDURE — 80053 COMPREHEN METABOLIC PANEL: CPT

## 2021-07-07 PROCEDURE — 87077 CULTURE AEROBIC IDENTIFY: CPT

## 2021-07-07 PROCEDURE — 85025 COMPLETE CBC W/AUTO DIFF WBC: CPT

## 2021-07-07 PROCEDURE — 83605 ASSAY OF LACTIC ACID: CPT

## 2021-07-07 PROCEDURE — 87636 SARSCOV2 & INF A&B AMP PRB: CPT

## 2021-07-07 PROCEDURE — 36415 COLL VENOUS BLD VENIPUNCTURE: CPT

## 2021-07-07 PROCEDURE — 87040 BLOOD CULTURE FOR BACTERIA: CPT

## 2021-07-07 PROCEDURE — 87088 URINE BACTERIA CULTURE: CPT

## 2021-07-07 PROCEDURE — 81000 URINALYSIS NONAUTO W/SCOPE: CPT

## 2021-07-07 NOTE — ED GENERAL
General


Stated Complaint:  CDIFF,FEVER,N/V


Source of Information:  Patient


Exam Limitations:  No Limitations





History of Present Illness


Date Seen by Provider:  2021


Time Seen by Provider:  11:27


Initial Comments


Patient is a 51-year-old female who presents to the emergency department today 

with a chief complaint of having C. difficile colitis for the past several weeks

as well as continued nausea, generalized malaise some vomiting and now "soft" 

stools. Patient endorses low-grade fever over the course of the last couple of 

days. She denies any URI type symptoms, earache, runny nose, sore throat or 

congestion. No cough or shortness of breath. She feels a little lightheaded with

standing. Patient states that when she was on vacation in [place] several 

weeks ago when her C. difficile was diagnosed that she had low potassium and low

magnesium. These were replaced and she saw her primary care provider about a 

week to a week and a half ago and had labs repeated and those were normal. P

atient states that when she was taking oral vancomycin she developed very stiff 

and sore joints. She was changed by her primary provider to Flagyl. Patient 

states that she only took 1 dose yesterday secondary to thinking that her 

symptoms were as a result of the Flagyl. She has not taken any today. She has 

been taking Zofran at home without much relief of her nausea.





All other review of systems reviewed and negative except as stated.


Timing/Duration:  1 Week


Associated Systoms:  Nausea/Vomiting, Other (diarrhea)





Allergies and Home Medications


Allergies


Coded Allergies:  


     Sulfa (Sulfonamide Antibiotics) (Unverified  Allergy, Mild, HIVE ON THROAT 

AND UPPER CHEST, 1/22/10)


     codeine (Unverified  Allergy, Mild, 08)


     hydrocodone (Unverified  Allergy, Unknown, 14)





Home Medications


Baclofen 10 Mg Tablet, 10 MG PO PRN, (Reported)


Baclofen 1,000 Gm Powder, 1,000 GM MC for MUSCLE SPASMS, (Reported)


Estradiol 1 Mg Tablet, 1 MG PO DAILY, (Reported)


Ibuprofen 800 Mg Tablet, 800 MG PO q8h PRN for PAIN


   Prescribed by: SHELLY CHAVEZ on 14 0824





Patient Home Medication List


Home Medication List Reviewed:  Yes





Review of Systems


Review of Systems


Constitutional:  see HPI


EENTM:  no symptoms reported


Respiratory:  no symptoms reported


Cardiovascular:  no symptoms reported


Gastrointestinal:  diarrhea ("soft stool"), loss of appetite, nausea


Genitourinary:  no symptoms reported


Musculoskeletal:  no symptoms reported


Skin:  no symptoms reported


Psychiatric/Neurological:  Anxiety





All Other Systems Reviewed


Negative Unless Noted:  Yes





Past Medical-Social-Family Hx


Immunizations Up To Date


Tetanus Booster (TDap):  Unknown


PED Vaccines UTD:  No





Seasonal Allergies


Seasonal Allergies:  No





Past Medical History


Surgeries:  Yes (D&C, LT HIP REPAIR W/ NERVE DAMAGE)


Adenoidectomy, Gallbladder, Hysterectomy, Orthopedic, Tonsillectomy


Respiratory:  Yes


Asthma


Cardiac:  No


Neurological:  Yes (BELL'S PALSY.  "Nerve damage of the left hip and LLE 

residual effects")


Reproductive Disorders:  Yes (COMPLETE HYSTERECTOMY)


Female Reproductive Disorders:  Endometriosis


GYN History:  Hysterectomy


Sexually Transmitted Disease:  No


HIV/AIDS:  No


Gastrointestinal:  No


Musculoskeletal:  Yes (BULGING DISCS.  CHRONIC NECK PAIN.)


Chronic Back Pain


Endocrine:  Yes (GRAVES DIEASE )


Hyperthyroidism


Loss of Vision:  Denies


Hearing Impairment:  Denies


Cancer:  No


Psychosocial:  Yes


Sleep Difficulties, Anxiety, Depression


Integumentary:  Yes ("ALLERGIC TO SUN")


Blood Disorders:  No





Family Medical History





Arthritis


  19 MOTHER, Onset:Unknown


Asthma


  19 FATHER, Onset:Unknown


Breast cancer in mother


Hypertension


  19 MOTHER, Onset:Unknown


Respiratory disorder


  19 FATHER, Onset:Unknown


Thyroid disease


  19 MOTHER, Onset:Unknown





No Family History of:


  AIDS


  Abdominal aortic aneurysm


  C[place]ic[place]'s disease


  Alcoholism


  Alzheimer's disease


  Aphasia


  Cancer of mouth


  Cardiovascular disease


  Cataracts


  Colon cancer


  Completed stroke


  Congenital disease


  Congenital heart disease


  Coronary thrombosis


  Cystic fibrosis


  Deafness or hearing loss


  Dementia


  Diabetes mellitus


  Drug abuse


  Dysphasia


  Fibrocystic disease of breast


  Gastroenteritis


  Glaucoma


  Headache disorder


  Hypercholesterolemia


  Infertility


  Kidney disease


  Myocardial infarction


  Neoplasm


  Not obtainable due to adoption


  Osteoporosis


  Parkinson's disease


  Prostate cancer


  Psychosocial problem


  Seizure disorder


  Severe allergy


  Tuberculosis


  Visual disorder





Physical Exam


Vital Signs





Vital Signs - First Documented








 21





 11:25


 


Temp 38.2


 


Pulse 85


 


Resp 18


 


B/P (MAP) 126/81


 


Pulse Ox 98


 


O2 Delivery Room Air





Capillary Refill :


Height, Weight, BMI


Height: 5'6.00"


Weight: 150lbs. 0.0oz. [id number]; 24.9 BMI


Method:Estimated


General Appearance:  No Apparent Distress, WD/WN


Eyes:  Bilateral Eye Normal Inspection, Bilateral Eye PERRL, Bilateral Eye EOMI


HEENT:  PERRL/EOMI


Neck:  Full Range of Motion, Normal Inspection, Non Tender, Supple


Respiratory:  Lungs Clear, Normal Breath Sounds


Cardiovascular:  Regular Rate, Rhythm


Gastrointestinal:  Soft, Tenderness (mid tender ness to palpation bilateral 

lower quadrants)


Extremity:  Normal Capillary Refill, Normal Inspection, Normal Range of Motion, 

Non Tender, No Calf Tenderness


Neurologic/Psychiatric:  Alert, Oriented x3, No Motor/Sensory Deficits, Normal 

Mood/Affect


Skin:  Normal Color, Warm/Dry





Focused Exam


Lactate Level


21 11:29: Lactic Acid Level 1.43





Lactic Acid Level





Laboratory Tests








Test


 21


11:29


 


Lactic Acid Level


 1.43 MMOL/L


(0.50-2.00)











Progress/Results/Core Measures


Suspected Sepsis


SIRS


Temperature: 


Pulse:  


Respiratory Rate: 


 


Laboratory Tests


21 11:29: White Blood Count 8.3


Blood Pressure  / 


Mean: 


 





21 11:29: Lactic Acid Level 1.43


Laboratory Tests


21 11:29: 


Creatinine 0.75, Platelet Count 372, Total Bilirubin 0.6








Results/Orders


Lab Results





Laboratory Tests








Test


 21


11:29 21


11:36 21


13:11 Range/Units


 


 


White Blood Count


 8.3 


 


 


 4.3-11.0


10^3/uL


 


Red Blood Count


 5.12 H


 


 


 3.80-5.11


10^6/uL


 


Hemoglobin 14.3    11.5-16.0  g/dL


 


Hematocrit 43    35-52  %


 


Mean Corpuscular Volume 84    80-99  fL


 


Mean Corpuscular Hemoglobin 28    25-34  pg


 


Mean Corpuscular Hemoglobin


Concent 33 


 


 


 32-36  g/dL





 


Red Cell Distribution Width 12.9    10.0-14.5  %


 


Platelet Count


 372 


 


 


 130-400


10^3/uL


 


Mean Platelet Volume 8.8 L   9.0-12.2  fL


 


Immature Granulocyte % (Auto) 0     %


 


Neutrophils (%) (Auto) 65    42-75  %


 


Lymphocytes (%) (Auto) 26    12-44  %


 


Monocytes (%) (Auto) 7    0-12  %


 


Eosinophils (%) (Auto) 2    0-10  %


 


Basophils (%) (Auto) 1    0-10  %


 


Neutrophils # (Auto)


 5.4 


 


 


 1.8-7.8


10^3/uL


 


Lymphocytes # (Auto)


 2.2 


 


 


 1.0-4.0


10^3/uL


 


Monocytes # (Auto)


 0.5 


 


 


 0.0-1.0


10^3/uL


 


Eosinophils # (Auto)


 0.1 


 


 


 0.0-0.3


10^3/uL


 


Basophils # (Auto)


 0.0 


 


 


 0.0-0.1


10^3/uL


 


Immature Granulocyte # (Auto)


 0.0 


 


 


 0.0-0.1


10^3/uL


 


Sodium Level 141    135-145  MMOL/L


 


Potassium Level 3.6    3.6-5.0  MMOL/L


 


Chloride Level 107      MMOL/L


 


Carbon Dioxide Level 25    21-32  MMOL/L


 


Anion Gap 9    5-14  MMOL/L


 


Blood Urea Nitrogen 9    7-18  MG/DL


 


Creatinine


 0.75 


 


 


 0.60-1.30


MG/DL


 


Estimat Glomerular Filtration


Rate > 60 


 


 


  





 


BUN/Creatinine Ratio 12     


 


Glucose Level 109 H     MG/DL


 


Lactic Acid Level


 1.43 


 


 


 0.50-2.00


MMOL/L


 


Calcium Level 9.9    8.5-10.1  MG/DL


 


Corrected Calcium 9.6    8.5-10.1  MG/DL


 


Total Bilirubin 0.6    0.1-1.0  MG/DL


 


Aspartate Amino Transf


(AST/SGOT) 29 


 


 


 5-34  U/L





 


Alanine Aminotransferase


(ALT/SGPT) 25 


 


 


 0-55  U/L





 


Alkaline Phosphatase 81      U/L


 


Total Protein 8.7 H   6.4-8.2  GM/DL


 


Albumin 4.4    3.2-4.5  GM/DL


 


SARS-CoV-2 RNA (RT-PCR)  Not Detected   Not Detecte  


 


Urine Color   YELLOW   


 


Urine Clarity   CLEAR   


 


Urine pH   8.0  5-9  


 


Urine Specific Gravity   1.015 L 1.016-1.022  


 


Urine Protein   NEGATIVE  NEGATIVE  


 


Urine Glucose (UA)   NEGATIVE  NEGATIVE  


 


Urine Ketones   NEGATIVE  NEGATIVE  


 


Urine Nitrite   NEGATIVE  NEGATIVE  


 


Urine Bilirubin   NEGATIVE  NEGATIVE  


 


Urine Urobilinogen   0.2  < = 1.0  MG/DL


 


Urine Leukocyte Esterase   TRACE H NEGATIVE  


 


Urine RBC (Auto)   NEGATIVE  NEGATIVE  


 


Urine RBC   NONE   /HPF


 


Urine WBC   RARE   /HPF


 


Urine Squamous Epithelial


Cells 


 


 0-2 


  /HPF





 


Urine Crystals   NONE   /LPF


 


Urine Bacteria   TRACE   /HPF


 


Urine Casts   NONE   /LPF


 


Urine Mucus   NEGATIVE   /LPF


 


Urine Culture Indicated   NO   








My Orders





Orders - NATASHA NIELSON MD


Cbc With Automated Diff (21 11:25)


Comprehensive Metabolic Panel (21 11:25)


Blood Culture (21 11:25)


Urine Culture (21 11:25)


Chest 1 View, Ap/Pa Only (21 11:25)


Ed Iv/Invasive Line Start (21 11:25)


Ed Iv/Invasive Line Start (21 11:25)


Vital Signs Adult Sepsis Patie Q15M (21 11:25)


O2 (21 11:25)


Remove Rings In Anticipation O (21 11:25)


Lactic Acid Analyzer (21 11:25)


Lactated Ringers (Lr 1000 Ml Iv Solution (21 12:00)


Diphenhydramine Injection (Benadryl Inje (21 12:00)


Dicyclomine Capsule (Bentyl Capsule) (21 12:00)


Covid 19 Inhouse Test (21 11:36)


Ua Culture If Indicated (21 13:15)





Medications Given in ED





Current Medications








 Medications  Dose


 Ordered  Sig/Marielos


 Route  Start Time


 Stop Time Status Last Admin


Dose Admin


 


 Diphenhydramine


 HCl  12.5 mg  ONCE  ONCE


 IV  21 12:00


 21 12:01 DC 21 12:05


12.5 MG








Vital Signs/I&O











 21





 11:25 11:25


 


Temp 38.2 38.2


 


Pulse 85 85


 


Resp 18 18


 


B/P (MAP) 126/81 126/81 (96)


 


Pulse Ox 98 98


 


O2 Delivery  Room Air





Capillary Refill :





Diagnostic Imaging





   Plain Films/CT/US/NM/MRI:  chest


Comments


                 [facility] [place], [place]





NAME:   SUNITA THAKKAR


[facility] REC#:   [id number]


ACCOUNT#:   [id number]


PT STATUS:   REG ER


:   1970


PHYSICIAN:   NATASHA NIELSON MD


ADMIT DATE:   21/ER


                                   ***Draft***


Date of Exam:21





CHEST 1 VIEW, AP/PA ONLY








INDICATION: Nausea, vomiting, and abdominal pain.





TIME OF EXAM: 12:53 p.m.





COMPARISON: Correlation is made with prior chest from 2014.





FINDINGS: The heart size is normal. The pulmonary vascularity is


unremarkable. The lungs are clear. No infiltrate, effusion or


pneumothorax is detected.





IMPRESSION: No acute cardiopulmonary process is detected.





  Dictated on workstation # [id number]








Dict:   21 1301


Trans:   21 1302


 [id number]





Interpreted by:     YORDY MARIN MD


Electronically signed by:





Departure


Impression





   Primary Impression:  


   C. difficile colitis


Disposition:   HOME, SELF-CARE


Condition:  Stable





Departure-Patient Inst.


Decision time for Depature:  13:57


Referrals:  


SHELLY CHAVEZ MD (PCP/Family)


Primary Care Physician


Patient Instructions:  Clostridioides difficile





Add. Discharge Instructions:  


Continue to try and drink plenty of fluids to stay well-hydrated.  You can drink

Pedialyte or propel fitness alatorre.  Drinking plenty of fluids may help your 

headache as well.





I have sent a prescription for Phenergan to your [facility] pharmacy.  You can take

1 of these every 6 hours to help with nausea.





Please take the metronidazole/Flagyl antibiotic as prescribed.  You are at risk 

for having a significant recurrence of your infection if you do not take it as 

prescribed.





You can take Excedrin Migraine, 2 tablets every 6 hours as needed for headache.





Come back to the emergency room for any new, concerning or emergent complaints. 

Please follow-up with  next week.


Scripts


Promethazine HCl (Promethazine Tablet) 25 Mg Tablet


25 MG PO Q6H PRN for NAUSEA/VOMITING, #15 TAB


   Prov: NATASHA NIELSON MD         21











NATASHA NIELSON MD          2021 11:26

## 2021-07-07 NOTE — DIAGNOSTIC IMAGING REPORT
INDICATION: Nausea, vomiting, and abdominal pain.



TIME OF EXAM: 12:53 p.m.



COMPARISON: Correlation is made with prior chest from 12/22/2014.



FINDINGS: The heart size is normal. The pulmonary vascularity is

unremarkable. The lungs are clear. No infiltrate, effusion or

pneumothorax is detected.



IMPRESSION: No acute cardiopulmonary process is detected.



Dictated by: 



  Dictated on workstation # [id number]

## 2021-10-28 ENCOUNTER — HOSPITAL ENCOUNTER (OUTPATIENT)
Dept: HOSPITAL 75 - RAD | Age: 51
End: 2021-10-28
Attending: FAMILY MEDICINE
Payer: COMMERCIAL

## 2021-10-28 DIAGNOSIS — Z12.31: Primary | ICD-10-CM

## 2021-10-28 PROCEDURE — 77067 SCR MAMMO BI INCL CAD: CPT

## 2021-10-28 PROCEDURE — 77063 BREAST TOMOSYNTHESIS BI: CPT

## 2021-10-28 NOTE — DIAGNOSTIC IMAGING REPORT
Digital mammogram 



Indication:  Bilateral screening



This study was compared to the prior exam of 03/04/2020,

12/4/2018 and 10/18/2017. 



At this time there are no current complaints. 



The current study was also evaluated with a Computer Aided

Detection (CAD) system.



FINDINGS: The fibroglandular tissue in both breasts is

heterogeneously dense. This does limit the sensitivity of this

exam. Overall, there does not appear to have been any significant

change when compared to the prior study. No primary or secondary

sign of malignancy is noted.



IMPRESSION: There is no radiographic evidence for malignancy.



ACR BI-RADS Category 1: Negative.

Result letter will be mailed to the patient.

Note:  At least 10% of breast cancer is not imaged by

mammography.



Dictated by: 



  Dictated on workstation # [id number]

## 2022-07-05 ENCOUNTER — HOSPITAL ENCOUNTER (OUTPATIENT)
Dept: HOSPITAL 75 - PREOP | Age: 52
Discharge: HOME | End: 2022-07-05
Attending: PODIATRIST
Payer: COMMERCIAL

## 2022-07-05 VITALS — BODY MASS INDEX: 30.43 KG/M2 | HEIGHT: 61.81 IN | WEIGHT: 165.35 LBS

## 2022-07-05 DIAGNOSIS — Z01.818: Primary | ICD-10-CM

## 2022-07-11 ENCOUNTER — HOSPITAL ENCOUNTER (OUTPATIENT)
Dept: HOSPITAL 75 - SDC | Age: 52
Discharge: HOME | End: 2022-07-11
Attending: PODIATRIST
Payer: COMMERCIAL

## 2022-07-11 VITALS — SYSTOLIC BLOOD PRESSURE: 121 MMHG | DIASTOLIC BLOOD PRESSURE: 74 MMHG

## 2022-07-11 VITALS — SYSTOLIC BLOOD PRESSURE: 118 MMHG | DIASTOLIC BLOOD PRESSURE: 74 MMHG

## 2022-07-11 VITALS — SYSTOLIC BLOOD PRESSURE: 107 MMHG | DIASTOLIC BLOOD PRESSURE: 93 MMHG

## 2022-07-11 VITALS — SYSTOLIC BLOOD PRESSURE: 122 MMHG | DIASTOLIC BLOOD PRESSURE: 80 MMHG

## 2022-07-11 VITALS — DIASTOLIC BLOOD PRESSURE: 74 MMHG | SYSTOLIC BLOOD PRESSURE: 102 MMHG

## 2022-07-11 VITALS — DIASTOLIC BLOOD PRESSURE: 85 MMHG | SYSTOLIC BLOOD PRESSURE: 117 MMHG

## 2022-07-11 VITALS — WEIGHT: 165.35 LBS | HEIGHT: 61.81 IN | BODY MASS INDEX: 30.43 KG/M2

## 2022-07-11 VITALS — DIASTOLIC BLOOD PRESSURE: 57 MMHG | SYSTOLIC BLOOD PRESSURE: 106 MMHG

## 2022-07-11 VITALS — SYSTOLIC BLOOD PRESSURE: 104 MMHG | DIASTOLIC BLOOD PRESSURE: 75 MMHG

## 2022-07-11 VITALS — DIASTOLIC BLOOD PRESSURE: 92 MMHG | SYSTOLIC BLOOD PRESSURE: 117 MMHG

## 2022-07-11 VITALS — SYSTOLIC BLOOD PRESSURE: 109 MMHG | DIASTOLIC BLOOD PRESSURE: 79 MMHG

## 2022-07-11 DIAGNOSIS — M67.472: Primary | ICD-10-CM

## 2022-07-11 PROCEDURE — 87081 CULTURE SCREEN ONLY: CPT

## 2022-07-11 RX ADMIN — HYDROMORPHONE HYDROCHLORIDE ONE MG: 2 INJECTION, SOLUTION INTRAMUSCULAR; INTRAVENOUS; SUBCUTANEOUS at 08:10

## 2022-07-11 RX ADMIN — PROMETHAZINE HYDROCHLORIDE ONE MG: 25 INJECTION INTRAMUSCULAR; INTRAVENOUS at 09:46

## 2022-07-11 RX ADMIN — PROMETHAZINE HYDROCHLORIDE ONE MG: 25 INJECTION INTRAMUSCULAR; INTRAVENOUS at 09:37

## 2022-07-11 RX ADMIN — PROMETHAZINE HYDROCHLORIDE ONE MG: 25 INJECTION INTRAMUSCULAR; INTRAVENOUS at 09:23

## 2022-07-11 RX ADMIN — SODIUM CHLORIDE, SODIUM LACTATE, POTASSIUM CHLORIDE, AND CALCIUM CHLORIDE PRN MLS/HR: 600; 310; 30; 20 INJECTION, SOLUTION INTRAVENOUS at 07:08

## 2022-07-11 RX ADMIN — PROMETHAZINE HYDROCHLORIDE ONE MG: 25 INJECTION INTRAMUSCULAR; INTRAVENOUS at 09:43

## 2022-07-11 RX ADMIN — SODIUM CHLORIDE, SODIUM LACTATE, POTASSIUM CHLORIDE, AND CALCIUM CHLORIDE PRN MLS/HR: 600; 310; 30; 20 INJECTION, SOLUTION INTRAVENOUS at 07:55

## 2022-07-11 RX ADMIN — ONDANSETRON PRN MG: 2 INJECTION, SOLUTION INTRAMUSCULAR; INTRAVENOUS at 10:15

## 2022-07-11 RX ADMIN — HYDROMORPHONE HYDROCHLORIDE ONE MG: 2 INJECTION, SOLUTION INTRAMUSCULAR; INTRAVENOUS; SUBCUTANEOUS at 09:10

## 2022-07-11 RX ADMIN — ONDANSETRON PRN MG: 2 INJECTION, SOLUTION INTRAMUSCULAR; INTRAVENOUS at 09:43

## 2022-07-11 NOTE — OPERATIVE REPORT
DATE OF SERVICE:  07/11/2022



SURGEON:

Hilda Hammer DPM.



PREOPERATIVE DIAGNOSIS:

Soft tissue lesion, left foot.



POSTOPERATIVE DIAGNOSIS:

Soft tissue lesion, left foot.



PROCEDURE:

Excision of soft tissue, left foot.



WOUND CLASS:

Clean.



ANESTHESIA:

General.



HEMOSTASIS:

Pneumatic thigh tourniquet at 250 mmHg.



INDICATIONS:

This 52-year-old female presents complaining of a painful left foot. 

Conservative therapy is met with unsatisfactory results and the patient is

agreeable to surgical intervention after risks and complications were discussed

at length.  No guarantees were extended to the patient and she is willing to

proceed.



DESCRIPTION OF PROCEDURE:

The patient was brought back to the operating table, placed in secure supine

position.  A general anesthetic was then induced.  Appropriate timeout was

performed.  A pneumatic thigh tourniquet was placed on the left lower extremity

over several layers of padding.  The left foot was then prepped and draped in

normal sterile manner.  The left foot was then elevated, allowed to exsanguinate

after which the tourniquet was inflated to 250 mmHg.



Attention was then directed to the dorsal lateral aspect of the left fifth

metatarsal area where a 4 cm longitudinal linear incision was created.  The

incision was deepened in the same plane with great care to identify and retract

all vital neurovascular structures.  The incisions were deepened over areas that

were previously marked during the preoperative interview.  These were the most

painful areas for the patient.  The incision was deepened down to the

subcutaneous tissue where no ganglionic cyst type structure was identified.



Exploration was then carried out to the extensor digitorum brevis tendon going

to the fifth toe.  The longus and brevis tendons sheaths were explored, and no

significant pathology was identified.  There is some fibrous tissue noted to the

dorsal aspect of the fifth metatarsal just lateral to the extensor digitorum

brevis muscle belly.  This was taken and sent for gross and microscopic

evaluation.



There is no soft tissue lesion extruding from the fifth metatarsal cuboid joint

area or in between the fourth and fifth metatarsal articulation.



The wound was flushed with copious amounts of normal saline and closure was

performed in layers.  Deep closure was performed with 4-0 Vicryl, superficial

was closed with 4-0 Vicryl, skin closure with 4-0 Prolene in a horizontal

mattress type stitch.



Postoperative injection consisted of 10 mL of 0.5% Marcaine injected in a local

infusion to the surgical site followed by 10 mg dexamethasone to the 2 areas

that were of pinpoint tenderness to the patient.  Postoperative dressing

consisted of Betadine soaked Adaptic, sterile 4 x 4, sterile Kerlix all secured

with a Coban wrap.



The patient tolerated the anesthesia and procedure well, was transported from

the operating room to the recovery room with vital signs stable and vascular

status intact to all digits of the left foot.  She is to follow up in my office

in 10 days' period of time.  In the meantime, she is to rest, ice and elevate. 

She will be partial weightbearing with crutches.  She is performing range of

motion exercises to the left ankle as instructed.





Job ID: [id number]

DocumentID: [id number]

Dictated Date:  07/11/2022 09:08:54

Transcription Date: 07/11/2022 14:10:31

Dictated By: HILDA HAMMER DPM

## 2022-07-11 NOTE — PROGRESS NOTE-POST OPERATIVE
Post-Operative Progess Note


Surgeon (s)/Assistant (s)


Surgeon


KELLEY HAMMER DPM


Assistant:  none





Pre-Operative Diagnosis


Soft Tissue Lesions left foot





Post-Operative Diagnosis





same





Procedure & Operative Findings


Date of Procedure


7/11/22


Procedure Performed/Findings


excision of soft tissue left foot


Anesthesia Type


General





Estimated Blood Loss


Estimated blood loss (mL):  Minimal





Specimens/Packing


Specimens Removed


Fibrous tissue left foot











KELLEY HAMMER DPM              Jul 11, 2022 08:55

## 2022-07-11 NOTE — ANESTHESIA-GENERAL POST-OP
General


Patient Condition


Mental Status/LOC:  Same as Preop


Cardiovascular:  Satisfactory


Nausea/Vomiting:  Absent


Respiratory:  Satisfactory


Pain:  Controlled


Complications:  Absent





Post Op Complications


Complications


None





Follow Up Care/Instructions


Patient Instructions


None needed.





Anesthesia/Patient Condition


Patient Condition


Patient is doing well, no complaints, stable vital signs, no apparent adverse 

anesthesia problems.   


No complications reported per nursing.











GORGE PASTRANA CRNA          Jul 11, 2022 13:45

## 2022-07-11 NOTE — PROGRESS NOTE-PRE OPERATIVE
Pre-Operative Progress Note


H&P Reviewed


The H&P was reviewed, patient examined and no changes noted.


Date Seen by Provider:  Jul 11, 2022


Time Seen by Provider:  07:46


Date H&P Reviewed:  Jul 11, 2022


Time H&P Reviewed:  07:46


Pre-Operative Diagnosis:  Soft Tissue Lesions left foot











KELLEY HAMMER DPM              Jul 11, 2022 07:46